# Patient Record
Sex: FEMALE | Race: WHITE | URBAN - METROPOLITAN AREA
[De-identification: names, ages, dates, MRNs, and addresses within clinical notes are randomized per-mention and may not be internally consistent; named-entity substitution may affect disease eponyms.]

---

## 2018-11-28 ENCOUNTER — TELEPHONE (OUTPATIENT)
Dept: OTOLARYNGOLOGY | Facility: CLINIC | Age: 38
End: 2018-11-28

## 2018-11-28 NOTE — TELEPHONE ENCOUNTER
[facility] Call Center    Phone Message    May a detailed message be left on voicemail: yes    Reason for Call: Other: PT:   Pt calling in states she has moved to [place] and wanted to see if Dr. Cody can recommend an ENT specialists at [facility] [place]. Pt states she does have a f/u appt with her PCP in [place], Writer suggested for pt to f/u with her PCP for their recommendations. Please follow up with pt thanks       Action Taken: Message routed to:  [facility] ([facility]): ENT

## 2018-12-05 NOTE — TELEPHONE ENCOUNTER
Spoke with pt regarding request for a recommendation at the [facility]. Relayed per Dr. Cody he would recommend [facility]. Pt states understanding. Savanah MITCHELL RNCC